# Patient Record
Sex: MALE | Race: BLACK OR AFRICAN AMERICAN | NOT HISPANIC OR LATINO | URBAN - NONMETROPOLITAN AREA
[De-identification: names, ages, dates, MRNs, and addresses within clinical notes are randomized per-mention and may not be internally consistent; named-entity substitution may affect disease eponyms.]

---

## 2018-01-31 NOTE — PATIENT DISCUSSION
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 This can be due to many causes. It may cause diplopia that is often treated with either prism glasses for eye muscle surgery. The cause of diplopia was discussed with the patient and all their questions were answered.

## 2018-01-31 NOTE — PATIENT DISCUSSION
HYPERTROPIA (VERTICAL STRABISMUS)  :  REFER TO DR. ANDREWS OR DR. JEFFERSON FOR PRISM IN GLASSES VERSES SURGICAL INTERVENTION.

## 2018-01-31 NOTE — PATIENT DISCUSSION
Continue: Artificial Tears (dextran 70-hypromellose): drops: 1 drop single dose as needed in both eyes

## 2019-01-14 NOTE — PATIENT DISCUSSION
HYPERTROPIA (VERTICAL STRABISMUS) : WITH INCREASING HEAD TILT. IF SYMPTOMS WORSEN, RETURN TO DR. JEFFERSON FOR PRISM IN GLASSES VERSES SURGICAL INTERVENTION. PATIENT MAY WANT TO OBTAIN A SECOND OPINION ABOUT SURGERY FROM DR. ANDREWS.

## 2019-07-02 ENCOUNTER — IMPORTED ENCOUNTER (OUTPATIENT)
Dept: URBAN - NONMETROPOLITAN AREA CLINIC 1 | Facility: CLINIC | Age: 49
End: 2019-07-02

## 2019-07-02 PROCEDURE — 92012 INTRM OPH EXAM EST PATIENT: CPT

## 2019-07-02 NOTE — PATIENT DISCUSSION
COAG OU.-Discussed findings of exam in detail with the patient.-Importance of good compliance with medications was emphasized. - The patient states the Glaucoma gtts. are expensive. Discussed with the  patient that he is on the generic. -Continue Latanoprost OU QHS. - Continue Dorzolamide-Timolol OU BID.- Continue Brimonidine OU BID. RTC 4 month IOP Check prior to next appt [id number] [id number] CHITRA and associated symptoms.-Pt to begin artificial tears OU QID PRN. Pt will contact us if this does not provide relief.  Continue Tears and Lids Scrubs OU as directed.; Dr's Notes: PCP Jeff Davsi 07/2017

## 2019-10-09 ENCOUNTER — IMPORTED ENCOUNTER (OUTPATIENT)
Dept: URBAN - NONMETROPOLITAN AREA CLINIC 1 | Facility: CLINIC | Age: 49
End: 2019-10-09

## 2019-10-09 PROCEDURE — 92083 EXTENDED VISUAL FIELD XM: CPT

## 2019-10-09 NOTE — PATIENT DISCUSSION
COAG OU.-Discussed findings of exam in detail with the patient.-Importance of good compliance with medications was emphasized. - The patient states the Glaucoma gtts. are expensive. Discussed with the  patient that he is on the generic. -Continue Latanoprost OU QHS. - Continue Dorzolamide-Timolol OU BID.- Continue Brimonidine OU BID. RTC 4 month IOP Check prior to next appt [id number] [id number] CHITRA and associated symptoms.-Pt to begin artificial tears OU QID PRN. Pt will contact us if this does not provide relief.  Continue Tears and Lids Scrubs OU as directed.; Dr's Notes: PCP Laquita Saavedra 07/2017

## 2019-12-12 ENCOUNTER — IMPORTED ENCOUNTER (OUTPATIENT)
Dept: URBAN - NONMETROPOLITAN AREA CLINIC 1 | Facility: CLINIC | Age: 49
End: 2019-12-12

## 2019-12-12 PROCEDURE — 92012 INTRM OPH EXAM EST PATIENT: CPT

## 2019-12-12 NOTE — PATIENT DISCUSSION
COAG OU.-Discussed findings of exam in detail with the patient.-Importance of good compliance with medications was emphasized. - The patient states the Glaucoma gtts. are expensive. Discussed with the  patient that he is on the generic. -Continue Latanoprost OU QHS. - Continue Dorzolamide-Timolol OU BID.- Continue Brimonidine OU BID. CHITRA-Explained CHITRA and associated symptoms.-Pt to begin artificial tears OU QID PRN. Pt will contact us if this does not provide relief.  Continue Tears and Lids Scrubs OU as directed.; 's Notes: PCP Kiya Eric 07/2017

## 2020-04-16 ENCOUNTER — IMPORTED ENCOUNTER (OUTPATIENT)
Dept: URBAN - NONMETROPOLITAN AREA CLINIC 1 | Facility: CLINIC | Age: 50
End: 2020-04-16

## 2020-04-16 PROCEDURE — G2012 BRIEF CHECK IN BY MD/QHP: HCPCS

## 2020-04-16 NOTE — PATIENT DISCUSSION
COAG OU.-Discussed findings of exam in detail with the patient.-Importance of good compliance with medications was emphasized. - The patient states the Glaucoma gtts. are expensive. Discussed with the  patient that he is on the generic. -Continue Latanoprost OU QHS. - Continue Dorzolamide-Timolol OU BID.- Continue Brimonidine OU BID. vision stable per pt. CHITRA-Explained CHITRA and associated symptoms.-Pt to begin artificial tears OU QID PRN. Pt will contact us if this does not provide relief.  Continue Tears and Lids Scrubs OU as directed.; 's Notes: PCP Gaby Valentino 07/2017

## 2020-07-15 ENCOUNTER — IMPORTED ENCOUNTER (OUTPATIENT)
Dept: URBAN - NONMETROPOLITAN AREA CLINIC 1 | Facility: CLINIC | Age: 50
End: 2020-07-15

## 2020-07-15 PROCEDURE — 92012 INTRM OPH EXAM EST PATIENT: CPT

## 2020-07-15 NOTE — PATIENT DISCUSSION
COAG OU.-s/p SLT OD-Continue Latanoprost OU QHS. - Continue Dorzolamide-Timolol OU BID.- Continue Brimonidine OU BID.-MONITOR FOR IOP CHANGESDES-Explained CHITRA and associated symptoms.-Pt to begin artificial tears OU QID PRN. Pt will contact us if this does not provide relief.  Continue Tears and Lids Scrubs OU as directed.; Dr's Notes: PCP Aleshia Ross 07/2017

## 2020-07-30 NOTE — PATIENT DISCUSSION
HYPERTROPIA (VERTICAL STRABISMUS) : WITH INCREASING HEAD TILT. IF SYMPTOMS WORSEN, RETURN TO DR. JEFFERSON FOR PRISM IN GLASSES VERSES SURGICAL INTERVENTION.

## 2020-07-30 NOTE — PATIENT DISCUSSION
MIGRAINE WITH VISUAL AURA:  AVOID PRECIPITATING TRIGGERS BY TRYING TO DECREASE CAFFEINE INTAKE, GET MORE REST, DECREASE STRESS AND DO NOT SMOKE. REFER TO PCP FOR MANAGEMENT.

## 2020-07-30 NOTE — PATIENT DISCUSSION
Hypertropia is misalignment of the eyes. This can be due to many causes. It may cause diplopia that is often treated with either prism glasses for eye muscle surgery. The cause of diplopia was discussed with the patient and all their questions were answered.

## 2020-07-30 NOTE — PATIENT DISCUSSION
GLAUCOMA SUSPECT, OU :  POSITIVE FAMILY HISTORY &amp; HISTORY OF ELEVATED IOP. RETURN FOR FOLLOW UP AS SCHEDULED.

## 2020-11-18 ENCOUNTER — IMPORTED ENCOUNTER (OUTPATIENT)
Dept: URBAN - NONMETROPOLITAN AREA CLINIC 1 | Facility: CLINIC | Age: 50
End: 2020-11-18

## 2020-11-18 PROCEDURE — 92012 INTRM OPH EXAM EST PATIENT: CPT

## 2020-11-18 PROCEDURE — 92133 CPTRZD OPH DX IMG PST SGM ON: CPT

## 2020-11-18 NOTE — PATIENT DISCUSSION
COAG OU.-s/p SLT OD-Continue Latanoprost OU QHS. - Continue Dorzolamide-Timolol OU BID.- Continue Brimonidine OU BID.-MONITOR FOR IOP AND VF CHANGESDES-Explained CHITRA and associated symptoms.-Pt to begin artificial tears OU QID PRN. Pt will contact us if this does not provide relief.  Continue Tears and Lids Scrubs OU as directed.; Dr's Notes: PCP Jeff Davis 07/2017

## 2021-04-22 ENCOUNTER — IMPORTED ENCOUNTER (OUTPATIENT)
Dept: URBAN - NONMETROPOLITAN AREA CLINIC 1 | Facility: CLINIC | Age: 51
End: 2021-04-22

## 2021-04-22 PROCEDURE — 92012 INTRM OPH EXAM EST PATIENT: CPT

## 2021-04-22 NOTE — PATIENT DISCUSSION
COAG OU.-s/p SLT OD-Continue Latanoprost OU QHS. - Continue Dorzolamide-Timolol OU BID.- Continue Brimonidine OU BID.-MONITOR FOR IOP AND VF CHANGESDES-Explained CHITRA and associated symptoms.-Pt to begin artificial tears OU QID PRN. Pt will contact us if this does not provide relief.  Continue Tears and Lids Scrubs OU as directed.; Dr's Notes: PCP Laquita Saavedra 07/2017

## 2021-08-26 ENCOUNTER — IMPORTED ENCOUNTER (OUTPATIENT)
Dept: URBAN - NONMETROPOLITAN AREA CLINIC 1 | Facility: CLINIC | Age: 51
End: 2021-08-26

## 2021-08-26 PROCEDURE — 92083 EXTENDED VISUAL FIELD XM: CPT

## 2021-08-26 PROCEDURE — 99213 OFFICE O/P EST LOW 20 MIN: CPT

## 2021-08-26 NOTE — PATIENT DISCUSSION
COAG OU.-s/p SLT OD-Continue Latanoprost OU QHS. - Continue Dorzolamide-Timolol OU BID.- Continue Brimonidine OU BID.-MONITOR FOR IOP AND VF CHANGESDES-Explained CHITRA and associated symptoms.-Pt to begin artificial tears OU QID PRN. Pt will contact us if this does not provide relief.  Continue Tears and Lids Scrubs OU as directed.; Dr's Notes: PCP Guerita Rey 07/2017

## 2021-08-26 NOTE — PATIENT DISCUSSION
COAG OU.-s/p SLT OD-Continue Latanoprost OU QHS. - Continue Dorzolamide-Timolol OU BID.- Continue Brimonidine OU BID. -vf today/stable ou-MONITOR FOR IOP AND VF CHANGESDES-Explained CHITRA and associated symptoms.-Pt to begin artificial tears OU QID PRN. Pt will contact us if this does not provide relief.  Continue Tears and Lids Scrubs OU as directed.; Dr's Notes: PCP Guerita Rey 07/2017

## 2021-11-03 NOTE — PATIENT DISCUSSION
I have explained to the patient that they are at a slightly greater risk of glaucoma. Glaucoma potentially causes loss of peripheral vision due to damage to the optic nerve. I have explained that damage to the optic nerve from glaucoma is irreversible and that the available treatments for glaucoma are designed to prevent further damage if we determine glaucoma is present. I have explained the importance of regular follow-up with dilated eye exams to monitor for possible glaucoma.

## 2021-11-03 NOTE — PATIENT DISCUSSION
IOP is slightly higher than it has been in the past. Continue close monitoring. Recommend scheduling the next available visual field.

## 2021-11-08 NOTE — PATIENT DISCUSSION
*Glaucoma Suspect Counseling:  I have explained to the patient at length glaucoma potentially causes loss of peripheral vision due to damage to the optic nerve. I have explained that damage to the optic nerve from glaucoma is irreversible and that the available treatments for glaucoma are designed to prevent further damage if we determine glaucoma is present. I have explained the importance of regular follow-up with dilated eye exams to monitor for possible progression. Quality 110: Preventive Care And Screening: Influenza Immunization: Influenza Immunization previously received during influenza season Detail Level: Detailed Quality 226: Preventive Care And Screening: Tobacco Use: Screening And Cessation Intervention: Patient screened for tobacco use, is a smoker AND received Cessation Counseling Quality 431: Preventive Care And Screening: Unhealthy Alcohol Use - Screening: Patient not identified as an unhealthy alcohol user when screened for unhealthy alcohol use using a systematic screening method

## 2022-01-07 NOTE — PATIENT DISCUSSION
HVF today WNL OU. Thick pachs OU. No treatment indicated at this time. Follow-up as directed. Monitor.

## 2022-01-17 NOTE — PATIENT DISCUSSION
Educated patient on findings and condition. Discussed recent WNL HVF and thick pachymetry in office today patient. IOP stable/WNL OU. No treatment indicated at this time. Follow-up as directed. Monitor.

## 2022-02-14 ENCOUNTER — FOLLOW UP (OUTPATIENT)
Dept: RURAL CLINIC 1 | Facility: CLINIC | Age: 52
End: 2022-02-14

## 2022-02-14 DIAGNOSIS — H40.1131: ICD-10-CM

## 2022-02-14 DIAGNOSIS — H16.223: ICD-10-CM

## 2022-02-14 PROCEDURE — 99213 OFFICE O/P EST LOW 20 MIN: CPT

## 2022-02-14 PROCEDURE — 92133 CPTRZD OPH DX IMG PST SGM ON: CPT

## 2022-02-14 ASSESSMENT — TONOMETRY
OS_IOP_MMHG: 20
OD_IOP_MMHG: 19

## 2022-02-14 ASSESSMENT — VISUAL ACUITY
OU_CC: J1+
OD_CC: 20/20-1
OS_CC: 20/30-1

## 2022-04-09 ASSESSMENT — VISUAL ACUITY
OS_CC: 20/400
OS_PH: 20/30+
OS_CC: 20/100
OD_CC: 20/40
OS_CC: 20/80
OD_SC: 20/40
OD_PH: 20/25
OS_CC: 20/70
OS_CC: 20/60
OD_CC: 20/70
OD_CC: 20/70
OD_CC: 20/60
OS_SC: 20/70
OD_CC: 20/70

## 2022-04-09 ASSESSMENT — TONOMETRY
OD_IOP_MMHG: 19
OS_IOP_MMHG: 20
OS_IOP_MMHG: 20
OS_IOP_MMHG: 16
OS_IOP_MMHG: 26
OD_IOP_MMHG: 19
OD_IOP_MMHG: 17
OS_IOP_MMHG: 22
OD_IOP_MMHG: 20
OD_IOP_MMHG: 16
OD_IOP_MMHG: 24
OS_IOP_MMHG: 19

## 2022-07-11 ENCOUNTER — FOLLOW UP (OUTPATIENT)
Dept: RURAL CLINIC 1 | Facility: CLINIC | Age: 52
End: 2022-07-11

## 2022-07-11 DIAGNOSIS — H40.1131: ICD-10-CM

## 2022-07-11 DIAGNOSIS — H16.223: ICD-10-CM

## 2022-07-11 PROCEDURE — 92083 EXTENDED VISUAL FIELD XM: CPT

## 2022-07-11 PROCEDURE — 99213 OFFICE O/P EST LOW 20 MIN: CPT

## 2022-07-11 ASSESSMENT — TONOMETRY
OS_IOP_MMHG: 18
OD_IOP_MMHG: 17

## 2022-07-11 ASSESSMENT — VISUAL ACUITY
OD_PH: 20/25
OS_PH: 20/25
OD_SC: 20/50+2
OS_SC: 20/50-2

## 2022-11-11 ENCOUNTER — FOLLOW UP (OUTPATIENT)
Dept: RURAL CLINIC 1 | Facility: CLINIC | Age: 52
End: 2022-11-11

## 2022-11-11 DIAGNOSIS — H16.223: ICD-10-CM

## 2022-11-11 DIAGNOSIS — H40.1131: ICD-10-CM

## 2022-11-11 PROCEDURE — 92133 CPTRZD OPH DX IMG PST SGM ON: CPT

## 2022-11-11 PROCEDURE — 99214 OFFICE O/P EST MOD 30 MIN: CPT

## 2022-11-11 ASSESSMENT — VISUAL ACUITY
OU_SC: 20/50
OD_SC: 20/50-1
OS_SC: 20/100

## 2022-11-11 ASSESSMENT — TONOMETRY
OD_IOP_MMHG: 16
OS_IOP_MMHG: 15

## 2023-07-10 ENCOUNTER — FOLLOW UP (OUTPATIENT)
Dept: RURAL CLINIC 1 | Facility: CLINIC | Age: 53
End: 2023-07-10

## 2023-07-10 DIAGNOSIS — H16.223: ICD-10-CM

## 2023-07-10 DIAGNOSIS — H40.1131: ICD-10-CM

## 2023-07-10 PROCEDURE — 99214 OFFICE O/P EST MOD 30 MIN: CPT

## 2023-07-10 PROCEDURE — 92083 EXTENDED VISUAL FIELD XM: CPT

## 2023-07-10 ASSESSMENT — VISUAL ACUITY
OS_SC: 20/60
OD_SC: 20/70+1
OU_SC: 20/60

## 2023-07-10 ASSESSMENT — TONOMETRY
OD_IOP_MMHG: 21
OS_IOP_MMHG: 21

## 2023-11-13 ENCOUNTER — FOLLOW UP (OUTPATIENT)
Dept: RURAL CLINIC 1 | Facility: CLINIC | Age: 53
End: 2023-11-13

## 2023-11-13 DIAGNOSIS — H40.1131: ICD-10-CM

## 2023-11-13 DIAGNOSIS — H16.223: ICD-10-CM

## 2023-11-13 PROCEDURE — 99214 OFFICE O/P EST MOD 30 MIN: CPT

## 2023-11-13 PROCEDURE — 92020 GONIOSCOPY: CPT

## 2023-11-13 ASSESSMENT — TONOMETRY
OD_IOP_MMHG: 20
OS_IOP_MMHG: 20

## 2023-11-13 ASSESSMENT — VISUAL ACUITY
OS_SC: 20/40
OD_SC: 20/40
OU_SC: 20/30

## 2024-04-10 ENCOUNTER — ESTABLISHED PATIENT (OUTPATIENT)
Dept: RURAL CLINIC 1 | Facility: CLINIC | Age: 54
End: 2024-04-10

## 2024-04-10 DIAGNOSIS — H16.223: ICD-10-CM

## 2024-04-10 DIAGNOSIS — H40.1131: ICD-10-CM

## 2024-04-10 PROCEDURE — 99214 OFFICE O/P EST MOD 30 MIN: CPT

## 2024-04-10 PROCEDURE — 92083 EXTENDED VISUAL FIELD XM: CPT

## 2024-04-10 ASSESSMENT — VISUAL ACUITY
OU_SC: 20/40
OD_SC: 20/70
OS_SC: 20/50
OD_SC: 20/20
OS_SC: 20/20
OU_SC: 20/20

## 2024-04-10 ASSESSMENT — TONOMETRY
OS_IOP_MMHG: 20
OD_IOP_MMHG: 20